# Patient Record
Sex: MALE | Race: BLACK OR AFRICAN AMERICAN | NOT HISPANIC OR LATINO | Employment: UNEMPLOYED | URBAN - METROPOLITAN AREA
[De-identification: names, ages, dates, MRNs, and addresses within clinical notes are randomized per-mention and may not be internally consistent; named-entity substitution may affect disease eponyms.]

---

## 2017-12-29 ENCOUNTER — HOSPITAL ENCOUNTER (OUTPATIENT)
Facility: HOSPITAL | Age: 42
Discharge: HOME | End: 2017-12-31
Attending: EMERGENCY MEDICINE | Admitting: HOSPITALIST

## 2017-12-29 DIAGNOSIS — I16.0 HYPERTENSIVE URGENCY: Primary | ICD-10-CM

## 2017-12-29 DIAGNOSIS — L03.90 CELLULITIS, UNSPECIFIED CELLULITIS SITE: ICD-10-CM

## 2017-12-29 DIAGNOSIS — L03.90 CELLULITIS: ICD-10-CM

## 2017-12-29 DIAGNOSIS — M27.2 ABSCESS OF MANDIBLE: ICD-10-CM

## 2017-12-29 LAB
ALBUMIN SERPL BCP-MCNC: 3.7 G/DL
ALP SERPL-CCNC: 84 U/L
ALT SERPL W/O P-5'-P-CCNC: 22 U/L
ANION GAP SERPL CALC-SCNC: 12 MMOL/L
AST SERPL-CCNC: 19 U/L
BASOPHILS # BLD AUTO: 0 K/UL
BASOPHILS NFR BLD: 0.3 %
BILIRUB SERPL-MCNC: 0.5 MG/DL
BUN SERPL-MCNC: 17 MG/DL
CALCIUM SERPL-MCNC: 10 MG/DL
CHLORIDE SERPL-SCNC: 103 MMOL/L
CO2 SERPL-SCNC: 26 MMOL/L
CREAT SERPL-MCNC: 1.2 MG/DL
DIFFERENTIAL METHOD: ABNORMAL
EOSINOPHIL # BLD AUTO: 0.1 K/UL
EOSINOPHIL NFR BLD: 1.2 %
ERYTHROCYTE [DISTWIDTH] IN BLOOD BY AUTOMATED COUNT: 14.6 %
EST. GFR  (AFRICAN AMERICAN): >60 ML/MIN/1.73 M^2
EST. GFR  (NON AFRICAN AMERICAN): >60 ML/MIN/1.73 M^2
GLUCOSE SERPL-MCNC: 146 MG/DL
HCT VFR BLD AUTO: 43.1 %
HGB BLD-MCNC: 14.3 G/DL
LACTATE SERPL-SCNC: 1.4 MMOL/L
LYMPHOCYTES # BLD AUTO: 2.1 K/UL
LYMPHOCYTES NFR BLD: 16.4 %
MCH RBC QN AUTO: 28.1 PG
MCHC RBC AUTO-ENTMCNC: 33.2 G/DL
MCV RBC AUTO: 85 FL
MONOCYTES # BLD AUTO: 0.6 K/UL
MONOCYTES NFR BLD: 4.4 %
NEUTROPHILS # BLD AUTO: 9.8 K/UL
NEUTROPHILS NFR BLD: 77.7 %
PLATELET # BLD AUTO: 214 K/UL
PMV BLD AUTO: 8.1 FL
POTASSIUM SERPL-SCNC: 3.2 MMOL/L
PROT SERPL-MCNC: 8.3 G/DL
RBC # BLD AUTO: 5.09 M/UL
SODIUM SERPL-SCNC: 141 MMOL/L
WBC # BLD AUTO: 12.6 K/UL

## 2017-12-29 PROCEDURE — 63600175 PHARM REV CODE 636 W HCPCS

## 2017-12-29 PROCEDURE — 63600175 PHARM REV CODE 636 W HCPCS: Performed by: NURSE PRACTITIONER

## 2017-12-29 PROCEDURE — G0378 HOSPITAL OBSERVATION PER HR: HCPCS

## 2017-12-29 PROCEDURE — 80053 COMPREHEN METABOLIC PANEL: CPT

## 2017-12-29 PROCEDURE — 25000003 PHARM REV CODE 250: Performed by: NURSE PRACTITIONER

## 2017-12-29 PROCEDURE — 96366 THER/PROPH/DIAG IV INF ADDON: CPT

## 2017-12-29 PROCEDURE — 25000003 PHARM REV CODE 250

## 2017-12-29 PROCEDURE — 96361 HYDRATE IV INFUSION ADD-ON: CPT

## 2017-12-29 PROCEDURE — 63600175 PHARM REV CODE 636 W HCPCS: Performed by: EMERGENCY MEDICINE

## 2017-12-29 PROCEDURE — 25000003 PHARM REV CODE 250: Performed by: EMERGENCY MEDICINE

## 2017-12-29 PROCEDURE — 83605 ASSAY OF LACTIC ACID: CPT

## 2017-12-29 PROCEDURE — 85025 COMPLETE CBC W/AUTO DIFF WBC: CPT

## 2017-12-29 PROCEDURE — 96365 THER/PROPH/DIAG IV INF INIT: CPT

## 2017-12-29 PROCEDURE — 99285 EMERGENCY DEPT VISIT HI MDM: CPT | Mod: 25

## 2017-12-29 PROCEDURE — 10060 I&D ABSCESS SIMPLE/SINGLE: CPT

## 2017-12-29 PROCEDURE — 36415 COLL VENOUS BLD VENIPUNCTURE: CPT

## 2017-12-29 PROCEDURE — 90715 TDAP VACCINE 7 YRS/> IM: CPT | Performed by: NURSE PRACTITIONER

## 2017-12-29 PROCEDURE — 25500020 PHARM REV CODE 255

## 2017-12-29 PROCEDURE — 90471 IMMUNIZATION ADMIN: CPT | Performed by: NURSE PRACTITIONER

## 2017-12-29 RX ORDER — IBUPROFEN 400 MG/1
800 TABLET ORAL
Status: COMPLETED | OUTPATIENT
Start: 2017-12-29 | End: 2017-12-29

## 2017-12-29 RX ORDER — POTASSIUM CHLORIDE 20 MEQ/15ML
40 SOLUTION ORAL
Status: DISCONTINUED | OUTPATIENT
Start: 2017-12-29 | End: 2017-12-31 | Stop reason: HOSPADM

## 2017-12-29 RX ORDER — ACETAMINOPHEN 325 MG/1
650 TABLET ORAL EVERY 4 HOURS PRN
Status: DISCONTINUED | OUTPATIENT
Start: 2017-12-29 | End: 2017-12-31 | Stop reason: HOSPADM

## 2017-12-29 RX ORDER — GLUCAGON 1 MG
1 KIT INJECTION
Status: DISCONTINUED | OUTPATIENT
Start: 2017-12-29 | End: 2017-12-31 | Stop reason: HOSPADM

## 2017-12-29 RX ORDER — MORPHINE SULFATE 4 MG/ML
INJECTION, SOLUTION INTRAMUSCULAR; INTRAVENOUS
Status: COMPLETED
Start: 2017-12-29 | End: 2017-12-29

## 2017-12-29 RX ORDER — ONDANSETRON 2 MG/ML
8 INJECTION INTRAMUSCULAR; INTRAVENOUS EVERY 8 HOURS PRN
Status: DISCONTINUED | OUTPATIENT
Start: 2017-12-29 | End: 2017-12-31 | Stop reason: HOSPADM

## 2017-12-29 RX ORDER — LIDOCAINE HYDROCHLORIDE 20 MG/ML
10 INJECTION, SOLUTION INFILTRATION; PERINEURAL
Status: ACTIVE | OUTPATIENT
Start: 2017-12-29 | End: 2017-12-30

## 2017-12-29 RX ORDER — MORPHINE SULFATE 2 MG/ML
8 INJECTION, SOLUTION INTRAMUSCULAR; INTRAVENOUS
Status: ACTIVE | OUTPATIENT
Start: 2017-12-29 | End: 2017-12-30

## 2017-12-29 RX ORDER — SODIUM CHLORIDE 9 MG/ML
1000 INJECTION, SOLUTION INTRAVENOUS
Status: COMPLETED | OUTPATIENT
Start: 2017-12-29 | End: 2017-12-29

## 2017-12-29 RX ORDER — POTASSIUM CHLORIDE 20 MEQ/15ML
60 SOLUTION ORAL
Status: DISCONTINUED | OUTPATIENT
Start: 2017-12-29 | End: 2017-12-31 | Stop reason: HOSPADM

## 2017-12-29 RX ORDER — AMOXICILLIN 250 MG
1 CAPSULE ORAL 2 TIMES DAILY
Status: DISCONTINUED | OUTPATIENT
Start: 2017-12-30 | End: 2017-12-31 | Stop reason: HOSPADM

## 2017-12-29 RX ORDER — IBUPROFEN 200 MG
16 TABLET ORAL
Status: DISCONTINUED | OUTPATIENT
Start: 2017-12-29 | End: 2017-12-31 | Stop reason: HOSPADM

## 2017-12-29 RX ORDER — ACETAMINOPHEN 500 MG
1000 TABLET ORAL EVERY 6 HOURS PRN
Status: DISCONTINUED | OUTPATIENT
Start: 2017-12-29 | End: 2017-12-31 | Stop reason: HOSPADM

## 2017-12-29 RX ORDER — SODIUM CHLORIDE 0.9 % (FLUSH) 0.9 %
5 SYRINGE (ML) INJECTION
Status: DISCONTINUED | OUTPATIENT
Start: 2017-12-29 | End: 2017-12-31 | Stop reason: HOSPADM

## 2017-12-29 RX ORDER — HYDROCODONE BITARTRATE AND ACETAMINOPHEN 10; 325 MG/1; MG/1
1 TABLET ORAL EVERY 6 HOURS PRN
Status: DISCONTINUED | OUTPATIENT
Start: 2017-12-29 | End: 2017-12-31 | Stop reason: HOSPADM

## 2017-12-29 RX ORDER — LANOLIN ALCOHOL/MO/W.PET/CERES
800 CREAM (GRAM) TOPICAL
Status: DISCONTINUED | OUTPATIENT
Start: 2017-12-29 | End: 2017-12-31 | Stop reason: HOSPADM

## 2017-12-29 RX ORDER — RAMELTEON 8 MG/1
8 TABLET ORAL NIGHTLY PRN
Status: DISCONTINUED | OUTPATIENT
Start: 2017-12-29 | End: 2017-12-31 | Stop reason: HOSPADM

## 2017-12-29 RX ORDER — IBUPROFEN 200 MG
24 TABLET ORAL
Status: DISCONTINUED | OUTPATIENT
Start: 2017-12-29 | End: 2017-12-31 | Stop reason: HOSPADM

## 2017-12-29 RX ORDER — LIDOCAINE HYDROCHLORIDE 20 MG/ML
INJECTION, SOLUTION EPIDURAL; INFILTRATION; INTRACAUDAL; PERINEURAL
Status: COMPLETED
Start: 2017-12-29 | End: 2017-12-29

## 2017-12-29 RX ORDER — SODIUM CHLORIDE 9 MG/ML
INJECTION, SOLUTION INTRAVENOUS
Status: DISPENSED
Start: 2017-12-29 | End: 2017-12-30

## 2017-12-29 RX ADMIN — VANCOMYCIN HYDROCHLORIDE 1500 MG: 1 INJECTION, POWDER, LYOPHILIZED, FOR SOLUTION INTRAVENOUS at 10:12

## 2017-12-29 RX ADMIN — IBUPROFEN 800 MG: 400 TABLET, FILM COATED ORAL at 07:12

## 2017-12-29 RX ADMIN — CLOSTRIDIUM TETANI TOXOID ANTIGEN (FORMALDEHYDE INACTIVATED), CORYNEBACTERIUM DIPHTHERIAE TOXOID ANTIGEN (FORMALDEHYDE INACTIVATED), BORDETELLA PERTUSSIS TOXOID ANTIGEN (GLUTARALDEHYDE INACTIVATED), BORDETELLA PERTUSSIS FILAMENTOUS HEMAGGLUTININ ANTIGEN (FORMALDEHYDE INACTIVATED), BORDETELLA PERTUSSIS PERTACTIN ANTIGEN, AND BORDETELLA PERTUSSIS FIMBRIAE 2/3 ANTIGEN 0.5 ML: 5; 2; 2.5; 5; 3; 5 INJECTION, SUSPENSION INTRAMUSCULAR at 07:12

## 2017-12-29 RX ADMIN — LIDOCAINE HYDROCHLORIDE 200 MG: 20 INJECTION, SOLUTION EPIDURAL; INFILTRATION; INTRACAUDAL; PERINEURAL at 07:12

## 2017-12-29 RX ADMIN — SODIUM CHLORIDE 1000 ML: 0.9 INJECTION, SOLUTION INTRAVENOUS at 07:12

## 2017-12-29 RX ADMIN — SODIUM CHLORIDE 1000 ML: 0.9 INJECTION, SOLUTION INTRAVENOUS at 08:12

## 2017-12-29 RX ADMIN — MORPHINE SULFATE 8 MG: 4 INJECTION INTRAVENOUS at 10:12

## 2017-12-29 RX ADMIN — IOHEXOL 75 ML: 350 INJECTION, SOLUTION INTRAVENOUS at 08:12

## 2017-12-30 PROBLEM — Z72.0 TOBACCO ABUSE: Status: ACTIVE | Noted: 2017-12-30

## 2017-12-30 PROBLEM — H61.22 IMPACTED CERUMEN OF LEFT EAR: Status: ACTIVE | Noted: 2017-12-30

## 2017-12-30 PROBLEM — R03.0 ELEVATED BLOOD PRESSURE READING WITHOUT DIAGNOSIS OF HYPERTENSION: Status: ACTIVE | Noted: 2017-12-30

## 2017-12-30 PROBLEM — I10 ESSENTIAL HYPERTENSION: Status: ACTIVE | Noted: 2017-12-30

## 2017-12-30 PROBLEM — D16.9 OSTEOMA: Status: ACTIVE | Noted: 2017-12-30

## 2017-12-30 PROBLEM — M27.2 ABSCESS OF MANDIBLE: Status: ACTIVE | Noted: 2017-12-30

## 2017-12-30 PROBLEM — F12.90 MARIJUANA SMOKER: Status: ACTIVE | Noted: 2017-12-30

## 2017-12-30 PROBLEM — E87.6 HYPOKALEMIA: Status: ACTIVE | Noted: 2017-12-30

## 2017-12-30 PROBLEM — M50.30 DDD (DEGENERATIVE DISC DISEASE), CERVICAL: Status: ACTIVE | Noted: 2017-12-30

## 2017-12-30 PROBLEM — I16.0 HYPERTENSIVE URGENCY: Status: ACTIVE | Noted: 2017-12-30

## 2017-12-30 LAB
ALBUMIN SERPL BCP-MCNC: 3.6 G/DL
ALP SERPL-CCNC: 88 U/L
ALT SERPL W/O P-5'-P-CCNC: 23 U/L
ANION GAP SERPL CALC-SCNC: 8 MMOL/L
AST SERPL-CCNC: 19 U/L
BASOPHILS # BLD AUTO: 0 K/UL
BASOPHILS NFR BLD: 0.3 %
BILIRUB SERPL-MCNC: 0.5 MG/DL
BUN SERPL-MCNC: 13 MG/DL
CALCIUM SERPL-MCNC: 10 MG/DL
CHLORIDE SERPL-SCNC: 107 MMOL/L
CO2 SERPL-SCNC: 24 MMOL/L
CREAT SERPL-MCNC: 1.1 MG/DL
DIFFERENTIAL METHOD: ABNORMAL
EOSINOPHIL # BLD AUTO: 0.2 K/UL
EOSINOPHIL NFR BLD: 1.5 %
ERYTHROCYTE [DISTWIDTH] IN BLOOD BY AUTOMATED COUNT: 14.7 %
EST. GFR  (AFRICAN AMERICAN): >60 ML/MIN/1.73 M^2
EST. GFR  (NON AFRICAN AMERICAN): >60 ML/MIN/1.73 M^2
GLUCOSE SERPL-MCNC: 99 MG/DL
HCT VFR BLD AUTO: 42.5 %
HGB BLD-MCNC: 14.3 G/DL
LYMPHOCYTES # BLD AUTO: 2.8 K/UL
LYMPHOCYTES NFR BLD: 25.1 %
MAGNESIUM SERPL-MCNC: 2 MG/DL
MCH RBC QN AUTO: 28.6 PG
MCHC RBC AUTO-ENTMCNC: 33.7 G/DL
MCV RBC AUTO: 85 FL
MONOCYTES # BLD AUTO: 0.7 K/UL
MONOCYTES NFR BLD: 6.5 %
NEUTROPHILS # BLD AUTO: 7.5 K/UL
NEUTROPHILS NFR BLD: 66.6 %
PHOSPHATE SERPL-MCNC: 3.5 MG/DL
PLATELET # BLD AUTO: 216 K/UL
PMV BLD AUTO: 8.4 FL
POCT GLUCOSE: 107 MG/DL (ref 70–110)
POTASSIUM SERPL-SCNC: 4.1 MMOL/L
PROT SERPL-MCNC: 8 G/DL
RBC # BLD AUTO: 4.99 M/UL
SODIUM SERPL-SCNC: 139 MMOL/L
WBC # BLD AUTO: 11.2 K/UL

## 2017-12-30 PROCEDURE — 83735 ASSAY OF MAGNESIUM: CPT

## 2017-12-30 PROCEDURE — 25000003 PHARM REV CODE 250: Performed by: NURSE PRACTITIONER

## 2017-12-30 PROCEDURE — 36415 COLL VENOUS BLD VENIPUNCTURE: CPT

## 2017-12-30 PROCEDURE — 87077 CULTURE AEROBIC IDENTIFY: CPT

## 2017-12-30 PROCEDURE — 85025 COMPLETE CBC W/AUTO DIFF WBC: CPT

## 2017-12-30 PROCEDURE — G0378 HOSPITAL OBSERVATION PER HR: HCPCS

## 2017-12-30 PROCEDURE — 96368 THER/DIAG CONCURRENT INF: CPT

## 2017-12-30 PROCEDURE — 87186 SC STD MICRODIL/AGAR DIL: CPT

## 2017-12-30 PROCEDURE — 80053 COMPREHEN METABOLIC PANEL: CPT

## 2017-12-30 PROCEDURE — 25000003 PHARM REV CODE 250: Performed by: HOSPITALIST

## 2017-12-30 PROCEDURE — 99203 OFFICE O/P NEW LOW 30 MIN: CPT | Mod: ,,, | Performed by: SURGERY

## 2017-12-30 PROCEDURE — 63600175 PHARM REV CODE 636 W HCPCS: Performed by: HOSPITALIST

## 2017-12-30 PROCEDURE — 84100 ASSAY OF PHOSPHORUS: CPT

## 2017-12-30 PROCEDURE — 63600175 PHARM REV CODE 636 W HCPCS: Performed by: NURSE PRACTITIONER

## 2017-12-30 PROCEDURE — 87070 CULTURE OTHR SPECIMN AEROBIC: CPT

## 2017-12-30 PROCEDURE — 96365 THER/PROPH/DIAG IV INF INIT: CPT

## 2017-12-30 PROCEDURE — S0077 INJECTION, CLINDAMYCIN PHOSP: HCPCS | Performed by: NURSE PRACTITIONER

## 2017-12-30 PROCEDURE — S4991 NICOTINE PATCH NONLEGEND: HCPCS | Performed by: NURSE PRACTITIONER

## 2017-12-30 PROCEDURE — 96366 THER/PROPH/DIAG IV INF ADDON: CPT

## 2017-12-30 RX ORDER — AMLODIPINE BESYLATE 5 MG/1
5 TABLET ORAL DAILY
Status: DISCONTINUED | OUTPATIENT
Start: 2017-12-30 | End: 2017-12-31

## 2017-12-30 RX ORDER — HYDRALAZINE HYDROCHLORIDE 20 MG/ML
10 INJECTION INTRAMUSCULAR; INTRAVENOUS EVERY 6 HOURS PRN
Status: DISCONTINUED | OUTPATIENT
Start: 2017-12-30 | End: 2017-12-31 | Stop reason: HOSPADM

## 2017-12-30 RX ORDER — LISINOPRIL 40 MG/1
40 TABLET ORAL DAILY
Status: DISCONTINUED | OUTPATIENT
Start: 2017-12-31 | End: 2017-12-31 | Stop reason: HOSPADM

## 2017-12-30 RX ORDER — LISINOPRIL 10 MG/1
20 TABLET ORAL ONCE
Status: COMPLETED | OUTPATIENT
Start: 2017-12-30 | End: 2017-12-30

## 2017-12-30 RX ORDER — CLINDAMYCIN PHOSPHATE 900 MG/50ML
900 INJECTION, SOLUTION INTRAVENOUS
Status: DISCONTINUED | OUTPATIENT
Start: 2017-12-30 | End: 2017-12-31 | Stop reason: HOSPADM

## 2017-12-30 RX ORDER — IBUPROFEN 200 MG
1 TABLET ORAL DAILY
Status: DISCONTINUED | OUTPATIENT
Start: 2017-12-30 | End: 2017-12-31 | Stop reason: HOSPADM

## 2017-12-30 RX ORDER — MORPHINE SULFATE 4 MG/ML
4 INJECTION, SOLUTION INTRAMUSCULAR; INTRAVENOUS EVERY 4 HOURS PRN
Status: DISCONTINUED | OUTPATIENT
Start: 2017-12-30 | End: 2017-12-31 | Stop reason: HOSPADM

## 2017-12-30 RX ORDER — LABETALOL HYDROCHLORIDE 5 MG/ML
20 INJECTION, SOLUTION INTRAVENOUS ONCE
Status: COMPLETED | OUTPATIENT
Start: 2017-12-30 | End: 2017-12-30

## 2017-12-30 RX ORDER — LISINOPRIL 10 MG/1
20 TABLET ORAL DAILY
Status: DISCONTINUED | OUTPATIENT
Start: 2017-12-30 | End: 2017-12-30

## 2017-12-30 RX ORDER — PANTOPRAZOLE SODIUM 40 MG/1
40 TABLET, DELAYED RELEASE ORAL DAILY
Status: DISCONTINUED | OUTPATIENT
Start: 2017-12-30 | End: 2017-12-31 | Stop reason: HOSPADM

## 2017-12-30 RX ORDER — MUPIROCIN 20 MG/G
OINTMENT TOPICAL DAILY
Status: DISCONTINUED | OUTPATIENT
Start: 2017-12-30 | End: 2017-12-31 | Stop reason: HOSPADM

## 2017-12-30 RX ADMIN — POTASSIUM CHLORIDE 40 MEQ: 20 SOLUTION ORAL at 03:12

## 2017-12-30 RX ADMIN — HYDROCODONE BITARTRATE AND ACETAMINOPHEN 1 TABLET: 10; 325 TABLET ORAL at 10:12

## 2017-12-30 RX ADMIN — STANDARDIZED SENNA CONCENTRATE AND DOCUSATE SODIUM 1 TABLET: 8.6; 5 TABLET, FILM COATED ORAL at 08:12

## 2017-12-30 RX ADMIN — STANDARDIZED SENNA CONCENTRATE AND DOCUSATE SODIUM 1 TABLET: 8.6; 5 TABLET, FILM COATED ORAL at 01:12

## 2017-12-30 RX ADMIN — STANDARDIZED SENNA CONCENTRATE AND DOCUSATE SODIUM 1 TABLET: 8.6; 5 TABLET, FILM COATED ORAL at 09:12

## 2017-12-30 RX ADMIN — CLINDAMYCIN IN 5 PERCENT DEXTROSE 900 MG: 18 INJECTION, SOLUTION INTRAVENOUS at 05:12

## 2017-12-30 RX ADMIN — VANCOMYCIN HYDROCHLORIDE 1500 MG: 1 INJECTION, POWDER, LYOPHILIZED, FOR SOLUTION INTRAVENOUS at 10:12

## 2017-12-30 RX ADMIN — PANTOPRAZOLE SODIUM 40 MG: 40 TABLET, DELAYED RELEASE ORAL at 01:12

## 2017-12-30 RX ADMIN — HYDROCODONE BITARTRATE AND ACETAMINOPHEN 1 TABLET: 10; 325 TABLET ORAL at 01:12

## 2017-12-30 RX ADMIN — VANCOMYCIN HYDROCHLORIDE 1500 MG: 1 INJECTION, POWDER, LYOPHILIZED, FOR SOLUTION INTRAVENOUS at 11:12

## 2017-12-30 RX ADMIN — AMLODIPINE BESYLATE 5 MG: 5 TABLET ORAL at 01:12

## 2017-12-30 RX ADMIN — HYDROCODONE BITARTRATE AND ACETAMINOPHEN 1 TABLET: 10; 325 TABLET ORAL at 07:12

## 2017-12-30 RX ADMIN — POTASSIUM CHLORIDE 40 MEQ: 20 SOLUTION ORAL at 01:12

## 2017-12-30 RX ADMIN — CLINDAMYCIN IN 5 PERCENT DEXTROSE 900 MG: 18 INJECTION, SOLUTION INTRAVENOUS at 10:12

## 2017-12-30 RX ADMIN — MUPIROCIN: 20 OINTMENT TOPICAL at 08:12

## 2017-12-30 RX ADMIN — LABETALOL HYDROCHLORIDE 20 MG: 5 INJECTION, SOLUTION INTRAVENOUS at 04:12

## 2017-12-30 RX ADMIN — LISINOPRIL 20 MG: 10 TABLET ORAL at 12:12

## 2017-12-30 RX ADMIN — MORPHINE SULFATE 4 MG: 4 INJECTION INTRAVENOUS at 04:12

## 2017-12-30 RX ADMIN — CARBAMIDE PEROXIDE 6.5% 5 DROP: 6.5 LIQUID AURICULAR (OTIC) at 10:12

## 2017-12-30 RX ADMIN — HYDRALAZINE HYDROCHLORIDE 10 MG: 20 INJECTION INTRAMUSCULAR; INTRAVENOUS at 02:12

## 2017-12-30 RX ADMIN — NICOTINE 1 PATCH: 14 PATCH, EXTENDED RELEASE TRANSDERMAL at 12:12

## 2017-12-30 RX ADMIN — LISINOPRIL 20 MG: 10 TABLET ORAL at 10:12

## 2017-12-30 RX ADMIN — CARBAMIDE PEROXIDE 6.5% 5 DROP: 6.5 LIQUID AURICULAR (OTIC) at 09:12

## 2017-12-30 NOTE — ED PROVIDER NOTES
Encounter Date: 12/29/2017    SCRIBE #1 NOTE: I, Ana Feliciano , am scribing for, and in the presence of,  Mary Pugh NP. I have scribed the entire note.       History     Chief Complaint   Patient presents with    Abscess     on face x 2 days      12/29/2017  6:47 PM     Chief Complaint: Abscess       The patient is a 42 y.o. male who is presenting with the gradual onset of facial abscess that began x 3 days ago. The pt reports that he had an ingrown hair to the R mandible that has since progressed to a large abscess. He denies any other sx. No nausea, emesis, fever, difficulty breathing, or swallowing. No other comments or complaints. Pt denies having a PCP or medical evaluation within the last several years. No pertinent PMHx or past surgical hx.             The history is provided by the patient and medical records.     Review of patient's allergies indicates:  No Known Allergies  History reviewed. No pertinent past medical history.  History reviewed. No pertinent surgical history.  History reviewed. No pertinent family history.  Social History   Substance Use Topics    Smoking status: Current Every Day Smoker     Packs/day: 1.00     Types: Cigarettes    Smokeless tobacco: Not on file    Alcohol use Not on file     Review of Systems   Constitutional: Negative for fever.   HENT: Negative for sore throat.    Respiratory: Negative for shortness of breath.    Cardiovascular: Negative for chest pain.   Gastrointestinal: Negative for nausea.   Genitourinary: Negative for dysuria.   Musculoskeletal: Negative for back pain.   Skin: Negative for rash.        Facial abscess    Neurological: Negative for weakness.   Hematological: Does not bruise/bleed easily.       Physical Exam     Initial Vitals [12/29/17 1757]   BP Pulse Resp Temp SpO2   (!) 234/133 (!) 120 (!) 24 99.2 °F (37.3 °C) 99 %      MAP       166.67         Physical Exam    Nursing note and vitals reviewed.  Constitutional: He appears well-developed and  well-nourished.   HENT:   Mouth/Throat: Oropharynx is clear and moist.   6 by 6 cm area of induration that is raised with central fluctuance to the R mandible. No buccal changes. Able to swallow. Posterior oropharynx is normal without erythema, exudate, or edema. Uvula is midline. L ear has cerumen impaction. Normal R TM.    Eyes: EOM are normal. Pupils are equal, round, and reactive to light.   Neck: Normal range of motion. Neck supple.   Cardiovascular: Normal rate, regular rhythm and normal heart sounds.   Pulmonary/Chest: Breath sounds normal. He has no wheezes. He has no rales.   Abdominal: Soft. He exhibits no distension.   Musculoskeletal: Normal range of motion. He exhibits no edema or tenderness.   Lymphadenopathy:     He has no cervical adenopathy.   Neurological: He is alert and oriented to person, place, and time. He has normal strength.   Skin: Skin is warm and dry. Capillary refill takes less than 2 seconds. Abscess noted.        See description above         ED Course   I & D - Incision and Drainage  Date/Time: 12/29/2017 9:51 PM  Location procedure was performed: [facility] EMERGENCY DEPARTMENT  Performed by: KELSI SHAH  Authorized by: MELISSA COSTA   Assisting provider: MELISSA COSTA  Pre-operative diagnosis: abscess  Post-operative diagnosis: abscess  Consent Done: Yes  Consent: Verbal consent obtained.  Risks and benefits: risks, benefits and alternatives were discussed  Consent given by: patient  Body area: head/neck  Location details: face  Anesthesia: local infiltration    Anesthesia:  Local Anesthetic: lidocaine 2% without epinephrine  Anesthetic total: 5 mL  Patient sedated: no  Description of findings: see above   Scalpel size: 10  Incision type: single straight  Complexity: simple  Drainage: pus  Drainage amount: moderate  Wound treatment: incision and  drainage  Packing material: none  Complications: No  Specimens: No  Implants: No  Patient tolerance: Patient tolerated the  procedure well with no immediate complications        Labs Reviewed - No data to display          Medical Decision Making:   Differential Diagnosis:   Keo angina  Abscess  Cellulitis  sepsis       APC / Resident Notes:   Patient is a 42 y.o. male who presents to the ED 12/29/2017 who doing emergent evaluation for right facial abscess he noticed 4 days ago.  On exam patient has a 6 x 6 cm area of induration with central fluctuance along the right mandible; no buccal changes; uvula midline; trachea midline; no trismus; no dysphonia; denies difficulty breathing, eating, or drinking; he denies fever or history of DM II; maxillofacial CT consistent with superficial abscess; doubt ludwigs angina;  I&D performed as described above; she is notably tachycardic and hypertensive in ED; tachycardia resolved with 2 L of IV fluids and NSAID's; patient remains hypertensive; patient does report anxiety; patient likely has undiagnosed underlying HTN; CBC, CMP, and lactic acid noted; no sepsis; however given size of abscess on face and tachycardia; will admit patient for IV antibiotics and further evaluation and treatment; case discussed with hospital medicine team who is accepting of this admission; patient and family agreeable to plan of care.           Scribe Attestation:   Scribe #1: I performed the above scribed service and the documentation accurately describes the services I performed. I attest to the accuracy of the note.    Attending Attestation:           Physician Attestation for Scribe:  Physician Attestation Statement for Scribe #1: I, Mary Pugh, reviewed documentation, as scribed by in my presence, and it is both accurate and complete.     Comments: I, Mary Pugh, NP-C, personally performed the services described in this documentation. All medical record entries made by the scribe were at my direction and in my presence.  I have reviewed the chart and agree that the record reflects my personal performance and is  accurate and complete. TIM Gama.  9:46 PM 12/29/2017     I, TIM Gama, personally performed the services described in this documentation. All medical record entries made by the scribe were at my direction and in my presence.  I have reviewed the chart and agree that the record reflects my personal performance and is accurate and complete. TIM Gama.  6:55 PM 12/29/2017          ED Course as of Dec 29 2146   Fri Dec 29, 2017   2127 CT-Face:    1. No visible acute maxillofacial fracture.  2. There is likely cellulitis at the right aspect of the mandible with a small focal subcutaneous fluid  collection in this region with this fluid collection somewhat ill-defined measuring 1.6 x 1.0 x 2.1 cm,  cannot exclude a developing abscess.  3. There is borderline right submandibular lymphadenopathy.  (rad read)  [MR]      ED Course User Index  [MR] Roge Strange MD     Clinical Impression:   There were no encounter diagnoses.    Disposition:   Disposition: Discharged  Condition: Stable                        Mary Pugh NP  12/29/17 2153

## 2017-12-30 NOTE — HOSPITAL COURSE
The patient was monitored closely during his stay. He was placed on Iv vancomycin and Iv clindamycin. General surgery was consulted to evaluate abscess. He was also noted to have hypertensive urgency. He was given several doses of prn antihypertensives and also started on lisinopril and later Norvasc was added. Patient was educated on smoking cessation. The patient was noted to have overall improvement in his condition and was discharged to home when cleared by Surgeon. Patient educated on importance of smoking cessation and follow up out patient for close monitoring and control of his HTN.

## 2017-12-30 NOTE — ASSESSMENT & PLAN NOTE
Cellulitis with abscess, probably infected cyst.  Appears to be draining well with improvement of symptoms.  Will re-evaluate in AM for either further I&D or discharge on oral antibiotics.

## 2017-12-30 NOTE — HPI
Mr. Juvenal Singh is a 41 yo male with no past medical or surgical history.  He is admitted to the service of hospital medicine with a diagnosis of cellulitis.  He presented to the ER with a 3 day history of worsening skin sore on his right mandible.  He stated that he had an ingrowing hair that he tried to remove with tweezers and since then became increasingly swollen.  He denied nausea, emesis, fever, difficulty breathing, or swallowing.  He underwent I&D in the ER  I was asked to evaluate him for adequacy of drainage.   States he feels much better today

## 2017-12-30 NOTE — PLAN OF CARE
Problem: Patient Care Overview  Goal: Plan of Care Review  Outcome: Ongoing (interventions implemented as appropriate)  Alert/oriented  Pain management: Patient educated on stresses of pain and ways to prevent it  Tolerated pain medication without problems  BP controlled and monitored. Educated patient and spouse regarding HTN s/s  Ambulates without problems.   Right face edema. Dressing reinforced. Ice pack alternated. Tolerated without problems  Denies neuro facial deficits  Discussed smoking cessation.   Safe

## 2017-12-30 NOTE — NURSING
Patient reports he is NOT a diabetic.  His father has DM.    Denies need for bedside glucose testing   This AM BS 99  Will notify provider

## 2017-12-30 NOTE — ASSESSMENT & PLAN NOTE
Patient reported that he has been told in the past that his blood pressure was elevated.  Has not sought medical treatment for >20 years and has family history.  Initiated patient on oral Norvasc 5 mg  Hydralazine IV ordered prn  Discussed at length with patient detrimental outcomes of sustained elevated blood pressure and recommended follow up with primary care upon discharge.

## 2017-12-30 NOTE — CONSULTS
Juvenal Singh [id number] is a 42 y.o. male who has been consulted for vancomycin dosing.    The patient has the following labs:     Date Creatinine (mg/dl)    BUN WBC Count   12/30/2017 Estimated Creatinine Clearance: 96.6 mL/min (based on SCr of 1.2 mg/dL). Lab Results   Component Value Date    BUN 17 12/29/2017     Lab Results   Component Value Date    WBC 12.60 12/29/2017        Current weight is 104.3 kg (230 lb)    Cellulitis      The patient received  1500 mg on 12/29 at 2200 in ED    The patient will be started on vancomycin at a dose of 1500 mg every 12 hours (15mg/kg/dose).  The vancomycin trough has been ordered for 12/31 at 0930.      Patient will be followed by pharmacy for changes in renal function, toxicity, and efficacy.   Thank you for allowing us to participate in this patient's care.     Mariza Galdamez

## 2017-12-30 NOTE — SUBJECTIVE & OBJECTIVE
"History reviewed. No pertinent past medical history.    History reviewed. No pertinent surgical history.    Review of patient's allergies indicates:  No Known Allergies    No current facility-administered medications on file prior to encounter.      No current outpatient prescriptions on file prior to encounter.     Family History     Problem Relation (Age of Onset)    Diabetes Father    Hypertension Mother        Social History Main Topics    Smoking status: Current Every Day Smoker     Packs/day: 1.00     Types: Cigarettes    Smokeless tobacco: Never Used    Alcohol use Yes      Comment: "Bud light" occasional    Drug use: Yes     Types: Marijuana    Sexual activity: Yes     Review of Systems   Constitutional: Negative for activity change, appetite change, chills, fatigue and fever.   HENT: Positive for facial swelling (right mandible). Negative for congestion, hearing loss, postnasal drip, sinus pain, sinus pressure, sore throat, trouble swallowing and voice change.    Eyes: Negative for photophobia and visual disturbance.   Respiratory: Negative for cough, shortness of breath and wheezing.    Cardiovascular: Negative for chest pain, palpitations and leg swelling.   Gastrointestinal: Negative for abdominal pain, diarrhea, nausea and vomiting.   Endocrine: Negative for polydipsia, polyphagia and polyuria.   Genitourinary: Negative for difficulty urinating, dysuria, frequency and urgency.   Musculoskeletal: Negative for neck pain and neck stiffness.   Skin: Negative for rash and wound.   Neurological: Negative for dizziness, weakness, numbness and headaches.   Psychiatric/Behavioral: Negative for confusion. The patient is not nervous/anxious.      Objective:     Vital Signs (Most Recent):  Temp: 99.2 °F (37.3 °C) (12/29/17 1757)  Pulse: (P) 82 (12/30/17 0013)  Resp: 18 (12/29/17 2136)  BP: (!) (P) 223/120 (Blood pressure taken on both arms to verify) (12/30/17 0013)  SpO2: 99 % (12/29/17 2302) Vital Signs (24h " Range):  Temp:  [99.2 °F (37.3 °C)] 99.2 °F (37.3 °C)  Pulse:  [] (P) 82  Resp:  [18-24] 18  SpO2:  [93 %-99 %] 99 %  BP: (184-250)/() (P) 223/120     Weight: 104.3 kg (230 lb)  Body mass index is 32.08 kg/m².    Physical Exam   Constitutional: He is oriented to person, place, and time. He appears well-developed and well-nourished. No distress.   HENT:   Head: Normocephalic and atraumatic.   Eyes: Conjunctivae and EOM are normal. Pupils are equal, round, and reactive to light.   Neck: Normal range of motion. Neck supple. No tracheal deviation present. No thyromegaly present.   Cardiovascular: Normal rate, regular rhythm, normal heart sounds and intact distal pulses.  Exam reveals no gallop and no friction rub.    No murmur heard.  Pulses:       Dorsalis pedis pulses are 2+ on the right side, and 2+ on the left side.   Pulmonary/Chest: Effort normal and breath sounds normal. No accessory muscle usage. No respiratory distress. He has no wheezes. He has no rhonchi. He has no rales.   Abdominal: Soft. Bowel sounds are normal. He exhibits no distension and no mass. There is no tenderness.   Musculoskeletal: Normal range of motion. He exhibits no edema, tenderness or deformity.   Neurological: He is alert and oriented to person, place, and time. He has normal strength.   Skin: Skin is warm, dry and intact. Capillary refill takes less than 2 seconds. Lesion (Right mandible abscess, dressing in place s/p I&D) noted. No rash noted. He is not diaphoretic. No erythema.   Psychiatric: He has a normal mood and affect. His speech is normal and behavior is normal.   Vitals reviewed.        CRANIAL NERVES     CN III, IV, VI   Pupils are equal, round, and reactive to light.  Extraocular motions are normal.        Significant Labs:   CBC:   Recent Labs  Lab 12/29/17 1910   WBC 12.60   HGB 14.3   HCT 43.1        CMP:   Recent Labs  Lab 12/29/17 1910      K 3.2*      CO2 26   *   BUN 17    CREATININE 1.2   CALCIUM 10.0   PROT 8.3   ALBUMIN 3.7   BILITOT 0.5   ALKPHOS 84   AST 19   ALT 22   ANIONGAP 12   EGFRNONAA >60     Magnesium: No results for input(s): MG in the last 48 hours.    Significant Imaging: I have reviewed all pertinent imaging results/findings within the past 24 hours.     CT Mandible: Vrad Reading:    IMPRESSION:  1. No visible acute maxillofacial fracture.  2. There is likely cellulitis at the right aspect of the mandible with a small focal subcutaneous fluid  collection in this region with this fluid collection somewhat ill-defined measuring 1.6 x 1.0 x 2.1 cm,  cannot exclude a developing abscess.  3. There is borderline right submandibular lymphadenopathy.

## 2017-12-30 NOTE — NURSING
"Patient requests to "sleep" and not be disturbed for "A while" as he has had no sleep.  Agreed to check on but not disturb patient until 1600 VS.  Patients call light within reach  "

## 2017-12-30 NOTE — NURSING
BP reassessment. Elevated. Hydralazine given as ordered. Explained plan of care to patient and spouse. Telemetry. Sinus rhythm 81. Will continue to monitor.

## 2017-12-30 NOTE — ED NOTES
Pt presents to ED with c/o abscess to his right mandible. Per the pt he began having the abscess yesterday. Pt is AAOx4. Skin warm, dry to touch. Respirations even, nonlabored. NAD noted. Swelling noted to right mandible. Pt swallows with ease. No drainage noted from abscess. Pts wife at bedside.

## 2017-12-30 NOTE — HPI
Mr. Juvenal Singh is a 41 yo male with no past medical or surgical history.  He is admitted to the service of hospital medicine with a diagnosis of cellulitis.  He presented to the ER with a 3 day history of worsening skin sore on his right mandible.  He stated that he had an ingrowing hair that he tried to remove with tweezers and since then became increasingly swollen.  He denied nausea, emesis, fever, difficulty breathing, or swallowing. Pt denies having a PCP or medical evaluation within the last 20+ years. Patient evaluated in ER and found to have cellulitis and a forming subcutaneous abscess near the right mandible and underwent I&D in ER. Patient also noted to have hypertensive Urgency and placed in hospital for further evaluation and treatment.

## 2017-12-30 NOTE — SUBJECTIVE & OBJECTIVE
"No current facility-administered medications on file prior to encounter.      No current outpatient prescriptions on file prior to encounter.       Review of patient's allergies indicates:  No Known Allergies    History reviewed. No pertinent past medical history.  History reviewed. No pertinent surgical history.  Family History     Problem Relation (Age of Onset)    Diabetes Father    Hypertension Mother        Social History Main Topics    Smoking status: Current Every Day Smoker     Packs/day: 1.00     Types: Cigarettes    Smokeless tobacco: Never Used    Alcohol use Yes      Comment: "Bud light" occasional    Drug use: Yes     Types: Marijuana    Sexual activity: Yes     Review of Systems   Constitutional: Negative for activity change, appetite change, chills, fatigue, fever and unexpected weight change.   HENT: Negative for congestion, dental problem, hearing loss, nosebleeds, sinus pressure, sore throat and voice change.    Eyes: Negative for pain and visual disturbance.   Respiratory: Negative for cough, chest tightness and shortness of breath.    Cardiovascular: Negative for chest pain, palpitations and leg swelling.   Gastrointestinal: Negative for abdominal distention, abdominal pain, constipation, diarrhea, nausea and vomiting.   Endocrine: Negative for cold intolerance and heat intolerance.   Genitourinary: Negative for difficulty urinating, flank pain, frequency and hematuria.   Musculoskeletal: Negative for arthralgias, back pain, gait problem, joint swelling, myalgias, neck pain and neck stiffness.   Skin: Negative for rash.        Swelling and tenderness over right mandible.   Allergic/Immunologic: Negative for immunocompromised state.   Neurological: Negative for dizziness, tremors, seizures, syncope, weakness, light-headedness, numbness and headaches.   Hematological: Negative for adenopathy. Does not bruise/bleed easily.   Psychiatric/Behavioral: Negative for confusion, decreased " concentration, hallucinations, sleep disturbance and suicidal ideas. The patient is not nervous/anxious.      Objective:     Vital Signs (Most Recent):  Temp: 97.3 °F (36.3 °C) (12/30/17 1141)  Pulse: 83 (12/30/17 1141)  Resp: 20 (12/30/17 1141)  BP: (!) 198/110 (12/30/17 1141)  SpO2: 99 % (12/30/17 0806) Vital Signs (24h Range):  Temp:  [97.3 °F (36.3 °C)-99.2 °F (37.3 °C)] 97.3 °F (36.3 °C)  Pulse:  [] 83  Resp:  [18-24] 20  SpO2:  [93 %-100 %] 99 %  BP: (177-250)/() 198/110     Weight: 104.3 kg (230 lb)  Body mass index is 32.08 kg/m².    Physical Exam   Constitutional: He is oriented to person, place, and time. He appears well-developed and well-nourished.   HENT:   Head: Normocephalic and atraumatic.       Right Ear: External ear normal.   Left Ear: External ear normal.   Eyes: Conjunctivae are normal. Pupils are equal, round, and reactive to light.   Neck: Normal range of motion.   Cardiovascular: Normal rate and regular rhythm.    Pulmonary/Chest: Effort normal. No respiratory distress. He exhibits no tenderness.   Abdominal: Soft. He exhibits no distension and no mass.   Musculoskeletal: He exhibits no edema or tenderness.   Neurological: He is alert and oriented to person, place, and time. He has normal reflexes. No cranial nerve deficit.   Skin: Skin is warm and dry. No rash noted. No erythema. No pallor.   Psychiatric: He has a normal mood and affect. His behavior is normal. Judgment and thought content normal.   Nursing note and vitals reviewed.      Significant Labs:  CBC:   Recent Labs  Lab 12/30/17 0441   WBC 11.20   RBC 4.99   HGB 14.3   HCT 42.5      MCV 85   MCH 28.6   MCHC 33.7     CMP:   Recent Labs  Lab 12/30/17 0441   GLU 99   CALCIUM 10.0   ALBUMIN 3.6   PROT 8.0      K 4.1   CO2 24      BUN 13   CREATININE 1.1   ALKPHOS 88   ALT 23   AST 19   BILITOT 0.5       Significant Diagnostics:  I have reviewed all pertinent imaging results/findings within the past 24  hours.

## 2017-12-30 NOTE — ASSESSMENT & PLAN NOTE
With right mandible subcutaneous abscess/ S/P I&D in ER 12/29/17-  Continue IV Vancomycin  Add Iv clindamycin  Consult General Surgery for further evaluation and possible repeat I&D  Pain control

## 2017-12-30 NOTE — ASSESSMENT & PLAN NOTE
Dangers of marijuana smoking were reviewed with patient in detail and patient was encouraged to quit.

## 2017-12-30 NOTE — ASSESSMENT & PLAN NOTE
Dangers of cigarette smoking were reviewed with patient in detail and patient was encouraged to quit. Nicotine replacement options were discussed.

## 2017-12-30 NOTE — NURSING
BP elevated. Rates facial discomfort 3/10 compared to eariler. Right facial edema. Face covered with dressing. Dry and intact. Patient able to tolerate cup of chicken noodle soup.

## 2017-12-30 NOTE — H&P
"[facility] Hospital Medicine  History & Physical    Patient Name: Juvenal Singh  MRN: [id number]  Admission Date: 12/29/2017  Attending Physician: Maulik Tatum MD   Primary Care Provider: Primary Doctor No         Patient information was obtained from patient, spouse/SO and ER records.     Subjective:     Principal Problem:Cellulitis    Chief Complaint:   Chief Complaint   Patient presents with    Abscess     on face x 2 days         HPI: Mr. Juvenal Singh is a 43 yo male with no past medical or surgical history.  He is admitted to the service of hospital medicine with a diagnosis of cellulitis.  He presented to the ER with a 3 day history of worsening skin sore on his right mandible.  He stated that he had an ingrowing hair that he tried to remove with tweezers and since then became increasingly swollen.  He denied nausea, emesis, fever, difficulty breathing, or swallowing. Pt denies having a PCP or medical evaluation within the last 20+ years.     History reviewed. No pertinent past medical history.    History reviewed. No pertinent surgical history.    Review of patient's allergies indicates:  No Known Allergies    No current facility-administered medications on file prior to encounter.      No current outpatient prescriptions on file prior to encounter.     Family History     Problem Relation (Age of Onset)    Diabetes Father    Hypertension Mother        Social History Main Topics    Smoking status: Current Every Day Smoker     Packs/day: 1.00     Types: Cigarettes    Smokeless tobacco: Never Used    Alcohol use Yes      Comment: "Bud light" occasional    Drug use: Yes     Types: Marijuana    Sexual activity: Yes     Review of Systems   Constitutional: Negative for activity change, appetite change, chills, fatigue and fever.   HENT: Positive for facial swelling (right mandible). Negative for congestion, hearing loss, postnasal drip, sinus pain, sinus pressure, sore " throat, trouble swallowing and voice change.    Eyes: Negative for photophobia and visual disturbance.   Respiratory: Negative for cough, shortness of breath and wheezing.    Cardiovascular: Negative for chest pain, palpitations and leg swelling.   Gastrointestinal: Negative for abdominal pain, diarrhea, nausea and vomiting.   Endocrine: Negative for polydipsia, polyphagia and polyuria.   Genitourinary: Negative for difficulty urinating, dysuria, frequency and urgency.   Musculoskeletal: Negative for neck pain and neck stiffness.   Skin: Negative for rash and wound.   Neurological: Negative for dizziness, weakness, numbness and headaches.   Psychiatric/Behavioral: Negative for confusion. The patient is not nervous/anxious.      Objective:     Vital Signs (Most Recent):  Temp: 99.2 °F (37.3 °C) (12/29/17 1757)  Pulse: (P) 82 (12/30/17 0013)  Resp: 18 (12/29/17 2136)  BP: (!) (P) 223/120 (Blood pressure taken on both arms to verify) (12/30/17 0013)  SpO2: 99 % (12/29/17 2302) Vital Signs (24h Range):  Temp:  [99.2 °F (37.3 °C)] 99.2 °F (37.3 °C)  Pulse:  [] (P) 82  Resp:  [18-24] 18  SpO2:  [93 %-99 %] 99 %  BP: (184-250)/() (P) 223/120     Weight: 104.3 kg (230 lb)  Body mass index is 32.08 kg/m².    Physical Exam   Constitutional: He is oriented to person, place, and time. He appears well-developed and well-nourished. No distress.   HENT:   Head: Normocephalic and atraumatic.   Eyes: Conjunctivae and EOM are normal. Pupils are equal, round, and reactive to light.   Neck: Normal range of motion. Neck supple. No tracheal deviation present. No thyromegaly present.   Cardiovascular: Normal rate, regular rhythm, normal heart sounds and intact distal pulses.  Exam reveals no gallop and no friction rub.    No murmur heard.  Pulses: intact  Pulmonary/Chest: Effort normal and breath sounds normal. No accessory muscle usage. No respiratory distress. He has no wheezes. He has no rhonchi. He has no rales.    Abdominal: Soft. Bowel sounds are normal. He exhibits no distension and no mass. There is no tenderness.   Musculoskeletal: Normal range of motion. He exhibits no edema, tenderness or deformity.   Neurological: He is alert and oriented to person, place, and time. He has normal strength.   Skin: Skin is warm, dry and intact. Capillary refill takes less than 2 seconds. Lesion (Right mandible abscess, dressing in place s/p I&D) noted. No rash noted. He is not diaphoretic. No erythema.   Psychiatric: He has a normal mood and affect. His speech is normal and behavior is normal.   Vitals reviewed.        CRANIAL NERVES     CN III, IV, VI   Pupils are equal, round, and reactive to light.  Extraocular motions are normal.        Significant Labs:   CBC:   Recent Labs  Lab 12/29/17 1910   WBC 12.60   HGB 14.3   HCT 43.1        CMP:   Recent Labs  Lab 12/29/17 1910      K 3.2*      CO2 26   *   BUN 17   CREATININE 1.2   CALCIUM 10.0   PROT 8.3   ALBUMIN 3.7   BILITOT 0.5   ALKPHOS 84   AST 19   ALT 22   ANIONGAP 12   EGFRNONAA >60     Magnesium: No results for input(s): MG in the last 48 hours.    Significant Imaging: I have reviewed all pertinent imaging results/findings within the past 24 hours.     CT Mandible: Vrad Reading:    IMPRESSION:  1. No visible acute maxillofacial fracture.  2. There is likely cellulitis at the right aspect of the mandible with a small focal subcutaneous fluid  collection in this region with this fluid collection somewhat ill-defined measuring 1.6 x 1.0 x 2.1 cm,  cannot exclude a developing abscess.  3. There is borderline right submandibular lymphadenopathy.    Assessment/Plan:     * Cellulitis    S/P I&D  Consult pharmacy for IV Vancomycin  Monitor airway              Elevated blood pressure reading without diagnosis of hypertension    Patient reported that he has been told in the past that his blood pressure was elevated.  Has not sought medical treatment for >20  years and has family history.  Initiated patient on oral Norvasc 5 mg  Hydralazine IV ordered prn  Discussed at length with patient detrimental outcomes of sustained elevated blood pressure and recommended follow up with primary care upon discharge.  Place on telemetry        Tobacco abuse    Dangers of cigarette smoking were reviewed with patient in detail and patient was encouraged to quit. Nicotine replacement options were discussed.          Marijuana smoker    Dangers of marijuana smoking were reviewed with patient in detail and patient was encouraged to quit.         Hypokalemia    Replete potassium  Trend CMP            VTE Risk Mitigation         Ordered     Low Risk of VTE  Once  SCD's    12/29/17 6353             Karla Jones NP  Department of Hospital Medicine   [facility]

## 2017-12-30 NOTE — SUBJECTIVE & OBJECTIVE
Interval History: General  surgery consulted to evaluate right mandible subcutaneous abscess for possible I&D.     Review of Systems   Constitutional: Negative for activity change, appetite change, chills, fatigue and fever.   HENT: Positive for facial swelling (right mandible). Negative for congestion, hearing loss, postnasal drip, sinus pain, sinus pressure, trouble swallowing and voice change.    Eyes: Negative for photophobia, pain and redness.   Respiratory: Negative for apnea, cough, choking, chest tightness, shortness of breath, wheezing and stridor.    Cardiovascular: Negative for chest pain, palpitations and leg swelling.   Gastrointestinal: Negative for abdominal distention, abdominal pain, blood in stool, diarrhea, nausea and vomiting.   Endocrine: Negative for polydipsia and polyphagia.   Genitourinary: Negative for difficulty urinating, dysuria, frequency, hematuria and urgency.   Musculoskeletal: Negative for neck pain and neck stiffness.   Skin: Positive for wound. Negative for color change and rash.        Right lower facial wound near mandible   Allergic/Immunologic: Negative for food allergies.   Neurological: Negative for dizziness, seizures, facial asymmetry, speech difficulty, weakness and headaches.   Hematological: Does not bruise/bleed easily.   Psychiatric/Behavioral: Negative for agitation, behavioral problems, confusion, hallucinations and suicidal ideas. The patient is not nervous/anxious.      Objective:     Vital Signs (Most Recent):  Temp: 97.3 °F (36.3 °C) (12/30/17 1141)  Pulse: 83 (12/30/17 1141)  Resp: 20 (12/30/17 1141)  BP: (!) 198/110 (12/30/17 1141)  SpO2: 99 % (12/30/17 0806) Vital Signs (24h Range):  Temp:  [97.3 °F (36.3 °C)-99.2 °F (37.3 °C)] 97.3 °F (36.3 °C)  Pulse:  [] 83  Resp:  [18-24] 20  SpO2:  [93 %-100 %] 99 %  BP: (177-250)/() 198/110     Weight: 104.3 kg (230 lb)  Body mass index is 32.08 kg/m².    Physical Exam   Constitutional: He is oriented to  person, place, and time. He appears well-developed and well-nourished. No distress.   HENT:   Head: Normocephalic and atraumatic.   Eyes: Conjunctivae and EOM are normal. Pupils are equal, round, and reactive to light. Right eye exhibits no discharge. Left eye exhibits no discharge.   Neck: Normal range of motion. Neck supple. No JVD present.   Cardiovascular: Normal rate, regular rhythm, normal heart sounds and intact distal pulses.  Exam reveals no gallop and no friction rub.    No murmur heard.  Pulses:       Dorsalis pedis pulses are 2+ on the right side, and 2+ on the left side.   Pulmonary/Chest: Effort normal and breath sounds normal. No accessory muscle usage or stridor. No respiratory distress. He has no wheezes. He has no rhonchi. He has no rales. He exhibits no tenderness.   Abdominal: Soft. Bowel sounds are normal. He exhibits no distension. There is no tenderness. There is no guarding.   Genitourinary:   Genitourinary Comments: Not examined   Musculoskeletal: Normal range of motion. He exhibits no edema, tenderness or deformity.   Neurological: He is alert and oriented to person, place, and time. He has normal strength.   Skin: Skin is warm, dry and intact. Capillary refill takes less than 2 seconds. Lesion: Right mandible abscess  He is not diaphoretic.   Right lower facial abscess with mild purulent drainage noted   Psychiatric: He has a normal mood and affect. His speech is normal and behavior is normal. Judgment and thought content normal.   Vitals reviewed.        CRANIAL NERVES     CN III, IV, VI   Pupils are equal, round, and reactive to light.  Extraocular motions are normal.      Labs: Reviewed

## 2017-12-30 NOTE — CONSULTS
Juvenal Singh [id number] is a 42 y.o. male who has been consulted for vancomycin dosing.    Vancomycin trough has been changed to 12/31 at 1030.      Patient will be followed by pharmacy for changes in renal function, toxicity, and efficacy.    Thank you for allowing us to participate in this patient's care.     Megha Ocampo, HanyD

## 2017-12-30 NOTE — PROGRESS NOTES
[facility] Hospital Medicine  Progress Note    Patient Name: Juvenal Singh  MRN: [id number]  Patient Class: OP- Observation   Admission Date: 12/29/2017  Length of Stay: 0 days  Attending Physician: Sarah Alexandra MD  Primary Care Provider: Primary Doctor No    Subjective:     Principal Problem:Cellulitis    HPI:  Mr. Juvenal Singh is a 43 yo male with no past medical or surgical history.  He is admitted to the service of hospital medicine with a diagnosis of cellulitis.  He presented to the ER with a 3 day history of worsening skin sore on his right mandible.  He stated that he had an ingrowing hair that he tried to remove with tweezers and since then became increasingly swollen.  He denied nausea, emesis, fever, difficulty breathing, or swallowing. Pt denies having a PCP or medical evaluation within the last 20+ years. Patient evaluated in ER and found to have cellulitis and a forming subcutaneous abscess near the right mandible.     Hospital Course:  The patient was monitored closely during his stay. He was placed on Iv vancomycin and Iv clindamycin. General surgery was consulted to evaluate abscess. He was also noted to have hypertensive urgency. He was given several doses of prn antihypertensives and also started on lisinopril. Patient was educated on smoking cessation.    Interval History: General  surgery consulted to evaluate right mandible subcutaneous abscess for possible I&D.     Review of Systems   Constitutional: Negative for activity change, appetite change, chills, fatigue and fever.   HENT: Positive for facial swelling (right mandible). Negative for congestion, hearing loss, postnasal drip, sinus pain, sinus pressure, trouble swallowing and voice change.    Eyes: Negative for photophobia, pain and redness.   Respiratory: Negative for apnea, cough, choking, chest tightness, shortness of breath, wheezing and stridor.    Cardiovascular: Negative for chest pain,  palpitations and leg swelling.   Gastrointestinal: Negative for abdominal distention, abdominal pain, blood in stool, diarrhea, nausea and vomiting.   Endocrine: Negative for polydipsia and polyphagia.   Genitourinary: Negative for difficulty urinating, dysuria, frequency, hematuria and urgency.   Musculoskeletal: Negative for neck pain and neck stiffness.   Skin: Positive for wound. Negative for color change and rash.        Right lower facial wound near mandible   Allergic/Immunologic: Negative for food allergies.   Neurological: Negative for dizziness, seizures, facial asymmetry, speech difficulty, weakness and headaches.   Hematological: Does not bruise/bleed easily.   Psychiatric/Behavioral: Negative for agitation, behavioral problems, confusion, hallucinations and suicidal ideas. The patient is not nervous/anxious.      Objective:     Vital Signs (Most Recent):  Temp: 97.3 °F (36.3 °C) (12/30/17 1141)  Pulse: 83 (12/30/17 1141)  Resp: 20 (12/30/17 1141)  BP: (!) 198/110 (12/30/17 1141)  SpO2: 99 % (12/30/17 0806) Vital Signs (24h Range):  Temp:  [97.3 °F (36.3 °C)-99.2 °F (37.3 °C)] 97.3 °F (36.3 °C)  Pulse:  [] 83  Resp:  [18-24] 20  SpO2:  [93 %-100 %] 99 %  BP: (177-250)/() 198/110     Weight: 104.3 kg (230 lb)  Body mass index is 32.08 kg/m².    Physical Exam   Constitutional: He is oriented to person, place, and time. He appears well-developed and well-nourished. No distress.   HENT:   Head: Normocephalic and atraumatic.   Eyes: Conjunctivae and EOM are normal. Pupils are equal, round, and reactive to light. Right eye exhibits no discharge. Left eye exhibits no discharge.   Neck: Normal range of motion. Neck supple. No JVD present.   Cardiovascular: Normal rate, regular rhythm, normal heart sounds and intact distal pulses.  Exam reveals no gallop and no friction rub.    No murmur heard.  Pulses:       Dorsalis pedis pulses are 2+ on the right side, and 2+ on the left side.   Pulmonary/Chest:  Effort normal and breath sounds normal. No accessory muscle usage or stridor. No respiratory distress. He has no wheezes. He has no rhonchi. He has no rales. He exhibits no tenderness.   Abdominal: Soft. Bowel sounds are normal. He exhibits no distension. There is no tenderness. There is no guarding.   Genitourinary:   Genitourinary Comments: Not examined   Musculoskeletal: Normal range of motion. He exhibits no edema, tenderness or deformity.   Neurological: He is alert and oriented to person, place, and time. He has normal strength.   Skin: Skin is warm, dry and intact. Capillary refill takes less than 2 seconds. Lesion: Right mandible abscess  He is not diaphoretic.   Right lower facial abscess with mild purulent drainage noted   Psychiatric: He has a normal mood and affect. His speech is normal and behavior is normal. Judgment and thought content normal.   Vitals reviewed.        CRANIAL NERVES     CN III, IV, VI   Pupils are equal, round, and reactive to light.  Extraocular motions are normal.      Labs: Reviewed    Assessment/Plan:      * Cellulitis    With right mandible subcutaneous abscess/ S/P I&D in ER 12/29/17-  Continue IV Vancomycin  Add Iv clindamycin  Consult General Surgery for further evaluation and possible repeat I&D  Pain control              Osteoma    As noted on Ct scan        Impacted cerumen of left ear    Add Debrox          DDD (degenerative disc disease), cervical    As noted on Ct scan          Hypertensive urgency    Add lisinopril and titrate as needed  Prn hydralazine          Tobacco abuse    Add nicotine patch  Smoking cessation education          Marijuana smoker    Dangers of marijuana smoking were reviewed with patient in detail and patient was encouraged to quit.         Hypokalemia    Monitor  Supplement as needed          Elevated blood pressure reading without diagnosis of hypertension     Pt now Dx with HTN  Pt educated on importance of blood pressure control and follow  up          VTE Risk Mitigation         Ordered     Low Risk of VTE  Once      12/29/17 6259          KYLEIGH Arciniega  Department of Hospital Medicine   [facility]    Time spent seeing patient( greater than 1/2 spent in direct contact) : 38 minutes

## 2017-12-30 NOTE — PLAN OF CARE
SW met w/ pt and wife for assessment.  Pt denies HH or use of DME, is independent w/ ADL. Pt does not have any health insurance/Medicaid, states has has never been sick or needed to see a doctor.  Sw provided pt and wife w/ list of community/low cost health care clinics as well as discussed application to see if eligible for Medicaid.       12/30/17 1018   Discharge Assessment   Assessment Type Discharge Planning Assessment   Confirmed/corrected address and phone number on facesheet? Yes   Assessment information obtained from? Patient;Caregiver  (w/ wife Jesi Singh @ bedside)   Prior to hospitilization cognitive status: Alert/Oriented   Prior to hospitalization functional status: Independent   Current cognitive status: Alert/Oriented   Current Functional Status: Independent   Lives With spouse;child(edgardo), adult   Able to Return to Prior Arrangements yes   Is patient able to care for self after discharge? Yes   Who are your caregiver(s) and their phone number(s)? wife:  Jeis Singh  [phone]   Patient's perception of discharge disposition home or selfcare   Readmission Within The Last 30 Days no previous admission in last 30 days   Patient currently being followed by outpatient case management? No   Patient currently receives any other outside agency services? No   Equipment Currently Used at Home negative pressure wound therapy device   Do you have any problems affording any of your prescribed medications? Yes   Does the patient have transportation home? Yes   Transportation Available family or friend will provide   Does the patient receive services at the Coumadin Clinic? No   Discharge Plan A Home with family   Discharge Plan B Home with family   Patient/Family In Agreement With Plan yes

## 2017-12-31 VITALS
HEIGHT: 71 IN | BODY MASS INDEX: 32.2 KG/M2 | HEART RATE: 78 BPM | DIASTOLIC BLOOD PRESSURE: 102 MMHG | OXYGEN SATURATION: 98 % | WEIGHT: 230 LBS | TEMPERATURE: 97 F | SYSTOLIC BLOOD PRESSURE: 173 MMHG | RESPIRATION RATE: 16 BRPM

## 2017-12-31 PROBLEM — I10 ESSENTIAL HYPERTENSION: Status: ACTIVE | Noted: 2017-12-31

## 2017-12-31 PROBLEM — I16.0 HYPERTENSIVE URGENCY: Status: RESOLVED | Noted: 2017-12-30 | Resolved: 2017-12-31

## 2017-12-31 PROBLEM — H61.22 IMPACTED CERUMEN OF LEFT EAR: Status: RESOLVED | Noted: 2017-12-30 | Resolved: 2017-12-31

## 2017-12-31 PROBLEM — Z98.890 STATUS POST INCISION AND DRAINAGE: Status: ACTIVE | Noted: 2017-12-31

## 2017-12-31 LAB
ALBUMIN SERPL BCP-MCNC: 3.5 G/DL
ALP SERPL-CCNC: 87 U/L
ALT SERPL W/O P-5'-P-CCNC: 19 U/L
ANION GAP SERPL CALC-SCNC: 11 MMOL/L
AST SERPL-CCNC: 15 U/L
BASOPHILS # BLD AUTO: 0 K/UL
BASOPHILS NFR BLD: 0.3 %
BILIRUB SERPL-MCNC: 0.5 MG/DL
BUN SERPL-MCNC: 12 MG/DL
CALCIUM SERPL-MCNC: 9.7 MG/DL
CHLORIDE SERPL-SCNC: 103 MMOL/L
CO2 SERPL-SCNC: 26 MMOL/L
CREAT SERPL-MCNC: 1.1 MG/DL
DIFFERENTIAL METHOD: ABNORMAL
EOSINOPHIL # BLD AUTO: 0.2 K/UL
EOSINOPHIL NFR BLD: 1.9 %
ERYTHROCYTE [DISTWIDTH] IN BLOOD BY AUTOMATED COUNT: 14.5 %
EST. GFR  (AFRICAN AMERICAN): >60 ML/MIN/1.73 M^2
EST. GFR  (NON AFRICAN AMERICAN): >60 ML/MIN/1.73 M^2
GLUCOSE SERPL-MCNC: 94 MG/DL
HCT VFR BLD AUTO: 42.5 %
HGB BLD-MCNC: 14.1 G/DL
LYMPHOCYTES # BLD AUTO: 1.9 K/UL
LYMPHOCYTES NFR BLD: 17.9 %
MAGNESIUM SERPL-MCNC: 2 MG/DL
MCH RBC QN AUTO: 28.5 PG
MCHC RBC AUTO-ENTMCNC: 33.2 G/DL
MCV RBC AUTO: 86 FL
MONOCYTES # BLD AUTO: 0.4 K/UL
MONOCYTES NFR BLD: 4.2 %
NEUTROPHILS # BLD AUTO: 7.9 K/UL
NEUTROPHILS NFR BLD: 75.7 %
PHOSPHATE SERPL-MCNC: 3.6 MG/DL
PLATELET # BLD AUTO: 214 K/UL
PMV BLD AUTO: 8.3 FL
POTASSIUM SERPL-SCNC: 3.6 MMOL/L
PROT SERPL-MCNC: 7.9 G/DL
RBC # BLD AUTO: 4.95 M/UL
SODIUM SERPL-SCNC: 140 MMOL/L
VANCOMYCIN TROUGH SERPL-MCNC: 10.6 UG/ML
WBC # BLD AUTO: 10.5 K/UL

## 2017-12-31 PROCEDURE — 25000003 PHARM REV CODE 250: Performed by: NURSE PRACTITIONER

## 2017-12-31 PROCEDURE — 80202 ASSAY OF VANCOMYCIN: CPT

## 2017-12-31 PROCEDURE — S0077 INJECTION, CLINDAMYCIN PHOSP: HCPCS | Performed by: NURSE PRACTITIONER

## 2017-12-31 PROCEDURE — 85025 COMPLETE CBC W/AUTO DIFF WBC: CPT

## 2017-12-31 PROCEDURE — 99212 OFFICE O/P EST SF 10 MIN: CPT | Mod: ,,, | Performed by: SURGERY

## 2017-12-31 PROCEDURE — 83735 ASSAY OF MAGNESIUM: CPT

## 2017-12-31 PROCEDURE — G0378 HOSPITAL OBSERVATION PER HR: HCPCS

## 2017-12-31 PROCEDURE — 84100 ASSAY OF PHOSPHORUS: CPT

## 2017-12-31 PROCEDURE — 96366 THER/PROPH/DIAG IV INF ADDON: CPT

## 2017-12-31 PROCEDURE — 96365 THER/PROPH/DIAG IV INF INIT: CPT

## 2017-12-31 PROCEDURE — S4991 NICOTINE PATCH NONLEGEND: HCPCS | Performed by: NURSE PRACTITIONER

## 2017-12-31 PROCEDURE — 80053 COMPREHEN METABOLIC PANEL: CPT

## 2017-12-31 PROCEDURE — 36415 COLL VENOUS BLD VENIPUNCTURE: CPT

## 2017-12-31 PROCEDURE — 63600175 PHARM REV CODE 636 W HCPCS: Performed by: HOSPITALIST

## 2017-12-31 PROCEDURE — 25000003 PHARM REV CODE 250: Performed by: HOSPITALIST

## 2017-12-31 RX ORDER — POTASSIUM CHLORIDE 20 MEQ/1
40 TABLET, EXTENDED RELEASE ORAL ONCE
Status: COMPLETED | OUTPATIENT
Start: 2017-12-31 | End: 2017-12-31

## 2017-12-31 RX ORDER — AMLODIPINE BESYLATE 5 MG/1
5 TABLET ORAL ONCE
Status: COMPLETED | OUTPATIENT
Start: 2017-12-31 | End: 2017-12-31

## 2017-12-31 RX ORDER — AMLODIPINE BESYLATE 10 MG/1
10 TABLET ORAL NIGHTLY
Qty: 30 TABLET | Refills: 1 | Status: SHIPPED | OUTPATIENT
Start: 2017-12-31 | End: 2018-01-30

## 2017-12-31 RX ORDER — CLINDAMYCIN HYDROCHLORIDE 300 MG/1
300 CAPSULE ORAL 4 TIMES DAILY
Qty: 32 CAPSULE | Refills: 0 | Status: SHIPPED | OUTPATIENT
Start: 2017-12-31 | End: 2018-01-08

## 2017-12-31 RX ORDER — AMLODIPINE BESYLATE 5 MG/1
10 TABLET ORAL DAILY
Status: DISCONTINUED | OUTPATIENT
Start: 2018-01-01 | End: 2017-12-31 | Stop reason: HOSPADM

## 2017-12-31 RX ORDER — IBUPROFEN 200 MG
1 TABLET ORAL DAILY
Refills: 0 | COMMUNITY
Start: 2018-01-01

## 2017-12-31 RX ORDER — ACETAMINOPHEN 500 MG
1000 TABLET ORAL EVERY 6 HOURS PRN
Refills: 0 | COMMUNITY
Start: 2017-12-31

## 2017-12-31 RX ORDER — MUPIROCIN 20 MG/G
OINTMENT TOPICAL DAILY
Qty: 15 G | Refills: 0 | Status: SHIPPED | OUTPATIENT
Start: 2018-01-01 | End: 2018-01-06

## 2017-12-31 RX ORDER — LISINOPRIL 40 MG/1
40 TABLET ORAL DAILY
Qty: 30 TABLET | Refills: 1 | Status: SHIPPED | OUTPATIENT
Start: 2018-01-01 | End: 2018-01-31

## 2017-12-31 RX ADMIN — POTASSIUM CHLORIDE 40 MEQ: 1500 TABLET, EXTENDED RELEASE ORAL at 10:12

## 2017-12-31 RX ADMIN — STANDARDIZED SENNA CONCENTRATE AND DOCUSATE SODIUM 1 TABLET: 8.6; 5 TABLET, FILM COATED ORAL at 08:12

## 2017-12-31 RX ADMIN — HYDROCODONE BITARTRATE AND ACETAMINOPHEN 1 TABLET: 10; 325 TABLET ORAL at 08:12

## 2017-12-31 RX ADMIN — NICOTINE 1 PATCH: 14 PATCH, EXTENDED RELEASE TRANSDERMAL at 08:12

## 2017-12-31 RX ADMIN — VANCOMYCIN HYDROCHLORIDE 1500 MG: 1 INJECTION, POWDER, LYOPHILIZED, FOR SOLUTION INTRAVENOUS at 11:12

## 2017-12-31 RX ADMIN — AMLODIPINE BESYLATE 5 MG: 5 TABLET ORAL at 11:12

## 2017-12-31 RX ADMIN — PANTOPRAZOLE SODIUM 40 MG: 40 TABLET, DELAYED RELEASE ORAL at 08:12

## 2017-12-31 RX ADMIN — MUPIROCIN: 20 OINTMENT TOPICAL at 08:12

## 2017-12-31 RX ADMIN — AMLODIPINE BESYLATE 5 MG: 5 TABLET ORAL at 08:12

## 2017-12-31 RX ADMIN — CLINDAMYCIN IN 5 PERCENT DEXTROSE 900 MG: 18 INJECTION, SOLUTION INTRAVENOUS at 01:12

## 2017-12-31 RX ADMIN — CLINDAMYCIN IN 5 PERCENT DEXTROSE 900 MG: 18 INJECTION, SOLUTION INTRAVENOUS at 10:12

## 2017-12-31 RX ADMIN — LISINOPRIL 40 MG: 40 TABLET ORAL at 08:12

## 2017-12-31 NOTE — DISCHARGE SUMMARY
[facility]  Hospital Medicine  Discharge Summary      Patient Name: Juvenal Singh  MRN: [id number]  Admission Date: 12/29/2017  Hospital Length of Stay: 0 days  Discharge Date and Time:  12/31/2017 11:26 AM  Attending Physician: Sarah Alexandra MD   Discharging Provider: KYLEIGH Arciniega  Primary Care Provider: Primary Doctor No    HPI:   Mr. Juvenal Singh is a 43 yo male with no past medical or surgical history.  He is admitted to the service of hospital medicine with a diagnosis of cellulitis.  He presented to the ER with a 3 day history of worsening skin sore on his right mandible.  He stated that he had an ingrowing hair that he tried to remove with tweezers and since then became increasingly swollen.  He denied nausea, emesis, fever, difficulty breathing, or swallowing. Pt denies having a PCP or medical evaluation within the last 20+ years. Patient evaluated in ER and found to have cellulitis and a forming subcutaneous abscess near the right mandible and underwent I&D in ER. Patient also noted to have hypertensive Urgency and placed in hospital for further evaluation and treatment.    * No surgery found *      Hospital Course:   The patient was monitored closely during his stay. He was placed on Iv vancomycin and Iv clindamycin. General surgery was consulted to evaluate abscess. He was also noted to have hypertensive urgency. He was given several doses of prn antihypertensives and also started on lisinopril and later Norvasc was added. Patient was educated on smoking cessation. The patient was noted to have overall improvement in his condition and was discharged to home when cleared by Surgeon. Patient educated on importance of smoking cessation and follow up out patient for close monitoring and control of his HTN.     Consults:   Consults         Status Ordering Provider     Inpatient consult to General Surgery  Once     Provider:  Lorenzo Vogt MD    Completed RAVEN DOUGLAS  RANDELL     Pharmacy to dose Vancomycin consult  Once     Provider:  (Not yet assigned)    Acknowledged KATHY LONDON        Service: Hospital Medicine    Final Active Diagnoses:    Diagnosis Date Noted POA    PRINCIPAL PROBLEM:  Cellulitis [L03.90] 12/29/2017 Yes    Essential hypertension [I10] 12/31/2017 Yes    Status post incision and drainage [Z98.890] 12/31/2017 Not Applicable    Elevated blood pressure reading without diagnosis of hypertension [R03.0] 12/30/2017 Yes    Hypokalemia [E87.6] 12/30/2017 Yes    Marijuana smoker [F12.20] 12/30/2017 Yes    Tobacco abuse [Z72.0] 12/30/2017 Yes    DDD (degenerative disc disease), cervical [M50.30] 12/30/2017 Yes    Abscess of mandible [M27.2] 12/30/2017 Yes    Osteoma [D16.9] 12/30/2017 Yes      Problems Resolved During this Admission:    Diagnosis Date Noted Date Resolved POA    Hypertensive urgency [I16.0] 12/30/2017 12/31/2017 Yes    Impacted cerumen of left ear [H61.22] 12/30/2017 12/31/2017 Yes       Discharged Condition: stable    Disposition: Home or Self Care    Follow Up:  Follow-up Information     Primary Doctor No.           Access [facility] In 2 weeks.    Why:  For uncontrolled Hypertension- Take blood pressure an dpulse 2 x day and keep log for follow up with PCP  Contact information:  [place] [id number]  [phone]                 Patient Instructions:     Activity as tolerated     Notify your health care provider if you experience any of the following:  temperature >100.4     Notify your health care provider if you experience any of the following:  severe persistent headache     Notify your health care provider if you experience any of the following:  persistent dizziness, light-headedness, or visual disturbances     Notify your health care provider if you experience any of the following:  increased confusion or weakness     Notify your health care provider if you experience any of the  following:   Order Comments: Any decline in condition     Change dressing (specify)   Order Comments: Clean right facial wound 2 x day with antibacterial soap and water and apply bactroban ointment till healed     Significant Diagnostic Studies:     CT Maxillofacial With Contrast-  1.  Cellulitis over right mandibular region with superimposed 1.9 x 1.0 x 2.1 cm subcutaneous ill-defined fluid collection in the area of clinical concern, most likely a developing subcutaneous abscess.  No evidence of adjacent osteomyelitis.  No facial bone fracture.    2.  Mild lymphadenopathy in the right submandibular lymph node chain.    3.  Mucosal retention cyst within the right maxillary sinus    4.  Incidentally observed osteoma within the right frontal sinus.    5.  Degenerative change and/or DISH of the cervical spine    Labs:   CMP   Recent Labs  Lab 12/29/17 1910 12/30/17 0441 12/31/17 0442    139 140   K 3.2* 4.1 3.6    107 103   CO2 26 24 26   * 99 94   BUN 17 13 12   CREATININE 1.2 1.1 1.1   CALCIUM 10.0 10.0 9.7   PROT 8.3 8.0 7.9   ALBUMIN 3.7 3.6 3.5   BILITOT 0.5 0.5 0.5   ALKPHOS 84 88 87   AST 19 19 15   ALT 22 23 19   ANIONGAP 12 8 11   ESTGFRAFRICA >60 >60 >60   EGFRNONAA >60 >60 >60    and CBC   Recent Labs  Lab 12/29/17 1910 12/30/17 0441 12/31/17 0442   WBC 12.60 11.20 10.50   HGB 14.3 14.3 14.1   HCT 43.1 42.5 42.5    216 214       Pending Diagnostic Studies:     None         Medications:  Reconciled Home Medications:   Current Discharge Medication List      START taking these medications    Details   acetaminophen (TYLENOL) 500 MG tablet Take 2 tablets (1,000 mg total) by mouth every 6 (six) hours as needed.  Refills: 0      amLODIPine (NORVASC) 10 MG tablet Take 1 tablet (10 mg total) by mouth every evening.  Qty: 30 tablet, Refills: 1      clindamycin (CLEOCIN) 300 MG capsule Take 1 capsule (300 mg total) by mouth 4 (four) times daily.  Qty: 32 capsule, Refills: 0       lisinopril (PRINIVIL,ZESTRIL) 40 MG tablet Take 1 tablet (40 mg total) by mouth once daily.  Qty: 30 tablet, Refills: 1      mupirocin (BACTROBAN) 2 % ointment by Nasal route once daily. Apply to nares and right facial wound Bid x 7 days  Qty: 15 g, Refills: 0      nicotine (NICODERM CQ) 14 mg/24 hr Place 1 patch onto the skin once daily.  Refills: 0             Indwelling Lines/Drains at time of discharge:   Lines/Drains/Airways          No matching active lines, drains, or airways        Time spent on the discharge of patient: 48 minutes  Patient was seen and examined on the date of discharge and determined to be suitable for discharge.    Jade Carney, KYLEIGH  Department of Hospital Medicine  [facility]

## 2017-12-31 NOTE — PLAN OF CARE
Problem: Fall Risk (Adult)  Goal: Absence of Falls  Patient will demonstrate the desired outcomes by discharge/transition of care.   Outcome: Outcome(s) achieved Date Met: 12/31/17  Patient free from falls or injuries

## 2017-12-31 NOTE — DISCHARGE INSTRUCTIONS
Thank you for choosing [facility] for your medical care. The primary doctor who is taking care of you at the time of your discharge is Sarah Alexandra MD.     You were admitted to the hospital with Cellulitis.     Please note your discharge instructions, including diet/activity restrictions, follow-up appointments, and medication changes.  If you have any questions about your medical issues, prescriptions, or any other questions, please feel free to contact the [facility] Medicine Dept at [phone] and we will help.    Please direct all long term medication refills and follow up to your primary care provider.  Please note the following discharge instructions per your discharging physician-  Jade Carney Np

## 2017-12-31 NOTE — CONSULTS
Juvenal Singh [id number] is a 42 y.o. male who has been consulted for vancomycin dosing.    The patient has the following labs:     Date Creatinine (mg/dl)    BUN WBC Count   12/31/2017 Estimated Creatinine Clearance: 105.3 mL/min (based on SCr of 1.1 mg/dL). Lab Results   Component Value Date    BUN 12 12/31/2017     Lab Results   Component Value Date    WBC 10.50 12/31/2017        Current weight is 104.3 kg (230 lb)    Pt is receiving vancomycin 1500 mg every 12 hours.  Vancomycin trough from 12/31 at 1013 was 10.6 mg/dL .  Target trough range is 10-15 mg/dL.   Trough was drawn on time and anticipate it is therapeutic.  Pharmacy will increase doses to vancomycin 1750 mg every 12 hours.  A vancomycin trough has been ordered prior to 4th dose due 01/01 at 2230.      Patient will be followed by pharmacy for changes in renal function, toxicity, and efficacy.  Thank you for allowing us to participate in this patient's care.     Jan Larsen, PharmD.

## 2017-12-31 NOTE — PLAN OF CARE
Problem: Patient Care Overview  Goal: Plan of Care Review  Outcome: Ongoing (interventions implemented as appropriate)  POC reviewed.  Patient to be discharged home with instructions and follow up

## 2017-12-31 NOTE — PROGRESS NOTES
Pt resting comfortably.  NOtes that area of his abscess has been feeling much better.  No pain.  Continued minimal drainae    Wt Readings from Last 3 Encounters:   12/29/17 104.3 kg (230 lb)     Temp Readings from Last 3 Encounters:   12/31/17 97.2 °F (36.2 °C) (Oral)     BP Readings from Last 3 Encounters:   12/31/17 (!) 173/102     Pulse Readings from Last 3 Encounters:   12/31/17 78     AAOx3  R jaw mandibular area with wound open draining.  No fluctuance.  Moderate induration      Lab Results   Component Value Date    WBC 10.50 12/31/2017    HGB 14.1 12/31/2017    HCT 42.5 12/31/2017    MCV 86 12/31/2017     12/31/2017     A/P: s/p I&D of facial abscess  No further surgical treatement need.  Will sign off

## 2018-01-01 NOTE — PLAN OF CARE
01/01/18 0914   Final Note   Assessment Type Final Discharge Note   Discharge Disposition Home

## 2018-01-02 LAB — BACTERIA SPEC AEROBE CULT: NORMAL
